# Patient Record
Sex: MALE | Race: WHITE | NOT HISPANIC OR LATINO | Employment: STUDENT | URBAN - METROPOLITAN AREA
[De-identification: names, ages, dates, MRNs, and addresses within clinical notes are randomized per-mention and may not be internally consistent; named-entity substitution may affect disease eponyms.]

---

## 2018-02-06 ENCOUNTER — EH NON-PROVIDER (OUTPATIENT)
Dept: OCCUPATIONAL MEDICINE | Facility: CLINIC | Age: 28
End: 2018-02-06

## 2018-02-06 ENCOUNTER — HOSPITAL ENCOUNTER (OUTPATIENT)
Facility: MEDICAL CENTER | Age: 28
End: 2018-02-06
Attending: PREVENTIVE MEDICINE
Payer: COMMERCIAL

## 2018-02-06 DIAGNOSIS — Z02.89 ENCOUNTER FOR OCCUPATIONAL HEALTH EXAMINATION: ICD-10-CM

## 2018-02-06 PROCEDURE — 86787 VARICELLA-ZOSTER ANTIBODY: CPT | Performed by: PREVENTIVE MEDICINE

## 2018-02-06 PROCEDURE — 86480 TB TEST CELL IMMUN MEASURE: CPT | Performed by: PREVENTIVE MEDICINE

## 2018-02-06 PROCEDURE — 86765 RUBEOLA ANTIBODY: CPT | Performed by: PREVENTIVE MEDICINE

## 2018-02-06 PROCEDURE — 90715 TDAP VACCINE 7 YRS/> IM: CPT | Performed by: PREVENTIVE MEDICINE

## 2018-02-06 PROCEDURE — 86735 MUMPS ANTIBODY: CPT | Performed by: PREVENTIVE MEDICINE

## 2018-02-06 PROCEDURE — 90686 IIV4 VACC NO PRSV 0.5 ML IM: CPT | Performed by: PREVENTIVE MEDICINE

## 2018-02-06 PROCEDURE — 86762 RUBELLA ANTIBODY: CPT | Performed by: PREVENTIVE MEDICINE

## 2018-02-07 LAB
MEV IGG SER IA-ACNC: POSITIVE
MUV IGG SER IA-ACNC: POSITIVE
RUBV AB SER QL: 129.9 IU/ML
VZV IGG SER IA-ACNC: POSITIVE

## 2018-02-09 LAB
M TB TUBERC IFN-G BLD QL: NEGATIVE
M TB TUBERC IFN-G/MITOGEN IGNF BLD: -0.1
M TB TUBERC IGNF/MITOGEN IGNF CONTROL: 40.7 [IU]/ML
MITOGEN IGNF BCKGRD COR BLD-ACNC: 0.21 [IU]/ML

## 2024-02-07 ENCOUNTER — APPOINTMENT (OUTPATIENT)
Dept: RADIOLOGY | Facility: IMAGING CENTER | Age: 34
End: 2024-02-07
Payer: COMMERCIAL

## 2024-02-07 ENCOUNTER — OFFICE VISIT (OUTPATIENT)
Dept: URGENT CARE | Facility: CLINIC | Age: 34
End: 2024-02-07
Payer: COMMERCIAL

## 2024-02-07 VITALS
RESPIRATION RATE: 14 BRPM | BODY MASS INDEX: 19.29 KG/M2 | TEMPERATURE: 98.1 F | HEIGHT: 66 IN | DIASTOLIC BLOOD PRESSURE: 54 MMHG | HEART RATE: 68 BPM | SYSTOLIC BLOOD PRESSURE: 100 MMHG | OXYGEN SATURATION: 99 % | WEIGHT: 120 LBS

## 2024-02-07 DIAGNOSIS — M54.9 SPINE PAIN: ICD-10-CM

## 2024-02-07 DIAGNOSIS — S39.012A BACK STRAIN, INITIAL ENCOUNTER: ICD-10-CM

## 2024-02-07 PROCEDURE — 3074F SYST BP LT 130 MM HG: CPT

## 2024-02-07 PROCEDURE — 72070 X-RAY EXAM THORAC SPINE 2VWS: CPT | Mod: TC | Performed by: RADIOLOGY

## 2024-02-07 PROCEDURE — 3078F DIAST BP <80 MM HG: CPT

## 2024-02-07 PROCEDURE — 72100 X-RAY EXAM L-S SPINE 2/3 VWS: CPT | Mod: TC | Performed by: RADIOLOGY

## 2024-02-07 PROCEDURE — 99203 OFFICE O/P NEW LOW 30 MIN: CPT

## 2024-02-07 ASSESSMENT — ENCOUNTER SYMPTOMS
NAUSEA: 0
DIZZINESS: 0
WEAKNESS: 0
NECK PAIN: 0
HEADACHES: 0
FALLS: 0
TINGLING: 0
SPEECH CHANGE: 0
BACK PAIN: 1
SENSORY CHANGE: 0
VOMITING: 0
SHORTNESS OF BREATH: 0
FEVER: 0
FOCAL WEAKNESS: 0
BLURRED VISION: 0
FLANK PAIN: 0

## 2024-02-07 NOTE — PROGRESS NOTES
"Subjective     Khai Rodgers is a 33 y.o. male who presents with back pain x1 week.     HPI:   Khai is a 34yo male presenting for a back pain x1 week. Reports he squatted a the gym while weight training and felt immediate pain in his spinal column extending from the mid thoracic region to the lumbar region. The pain then improved and is intermittently aggravated by twisting and bending motions. The pain is along the spine and does not radiate. No neck or hip involvement. The pain is relieved with positioning. Denies prior injury to back. No loss of bladder or bowel control. Denies sensation loss or numbness/tinging in lower extremities. No fever. Spine flexion and rotation is limited due to pain.      Review of Systems   Constitutional:  Negative for fever and malaise/fatigue.   Eyes:  Negative for blurred vision.   Respiratory:  Negative for shortness of breath.    Gastrointestinal:  Negative for nausea and vomiting.   Genitourinary:  Negative for dysuria and flank pain.   Musculoskeletal:  Positive for back pain. Negative for falls and neck pain.   Neurological:  Negative for dizziness, tingling, sensory change, speech change, focal weakness, weakness and headaches.     History reviewed. No pertinent past medical history.     History reviewed. No pertinent surgical history.     Allergies: Penicillins     Medications, Allergies, and current problem list reviewed today in Epic.      Objective     /54 (BP Location: Left arm, Patient Position: Sitting, BP Cuff Size: Adult)   Pulse 68   Temp 36.7 °C (98.1 °F) (Temporal)   Resp 14   Ht 1.676 m (5' 6\")   Wt 54.4 kg (120 lb)   SpO2 99%   BMI 19.37 kg/m²      Physical Exam  Vitals reviewed.   Constitutional:       General: He is not in acute distress.  Eyes:      Extraocular Movements: Extraocular movements intact.      Conjunctiva/sclera: Conjunctivae normal.      Pupils: Pupils are equal, round, and reactive to light.   Cardiovascular:      Rate and Rhythm: " Normal rate and regular rhythm.      Pulses: Normal pulses.      Heart sounds: Normal heart sounds.   Pulmonary:      Effort: Pulmonary effort is normal. No tachypnea, accessory muscle usage, prolonged expiration, respiratory distress or retractions.      Breath sounds: Normal breath sounds. No stridor. No wheezing, rhonchi or rales.   Musculoskeletal:      Right shoulder: No swelling, deformity, tenderness, bony tenderness or crepitus. Normal range of motion. Normal strength. Normal pulse.      Left shoulder: No swelling, deformity, tenderness, bony tenderness or crepitus. Normal range of motion. Normal strength. Normal pulse.      Cervical back: Full passive range of motion without pain, normal range of motion and neck supple. No signs of trauma, rigidity, tenderness or crepitus. No pain with movement, spinous process tenderness or muscular tenderness. Normal range of motion.      Thoracic back: No swelling, deformity, spasms, tenderness or bony tenderness. Decreased range of motion.      Lumbar back: No swelling, deformity, spasms, tenderness or bony tenderness. Decreased range of motion.      Right hip: No deformity, tenderness, bony tenderness or crepitus. Normal range of motion. Normal strength.      Left hip: No deformity, tenderness, bony tenderness or crepitus. Normal range of motion. Normal strength.      Comments: No impairment in gait. ROM of hip and bilateral lower extremities intact with no abnormality. Midline tenderness absent. Back flexion reduced to 25 degrees. Back extension and rotation without abnormality. BLE strength 5/5 and bilateral reflexes intact. Distal neurovascular intact     Lymphadenopathy:      Cervical: No cervical adenopathy.   Skin:     General: Skin is warm and dry.   Neurological:      General: No focal deficit present.      Mental Status: He is alert. Mental status is at baseline.      Cranial Nerves: Cranial nerves 2-12 are intact. No cranial nerve deficit.      Sensory:  Sensation is intact.      Motor: Motor function is intact. No weakness or abnormal muscle tone.      Coordination: Coordination is intact.      Gait: Gait is intact.      Deep Tendon Reflexes: Reflexes are normal and symmetric.   Psychiatric:         Mood and Affect: Mood normal.         Behavior: Behavior normal.         Thought Content: Thought content normal.       DX-LUMBAR SPINE-2 OR 3 VIEWS    Result Date: 2/7/2024 2/7/2024 10:05 AM HISTORY/REASON FOR EXAM:  Pain Following Trauma. Back pain after lifting weights. TECHNIQUE/ EXAM DESCRIPTION AND NUMBER OF VIEWS:  3 views of the lumbar spine. COMPARISON: None. FINDINGS: Vertebral alignment is preserved. Minimal levoconvex curvature. Vertebral body heights are preserved. Intervertebral disc spaces preserved. The facet joints show normal alignment. Lumbosacral junction is intact. Pedicles appear intact. Transitional vertebral anatomy with pseudoarthrosis on the RIGHT at the lumbosacral junction.  Probable partial lumbarization of S1.     1.  No lumbar spine fracture or subluxation. 2.  Minimal levoconvex curvature. 3.  Transitional vertebral anatomy.    DX-THORACIC SPINE-2 VIEWS    Result Date: 2/7/2024 2/7/2024 10:05 AM HISTORY/REASON FOR EXAM:  Pain Following Trauma. Back pain after lifting weights. TECHNIQUE/EXAM DESCRIPTION AND NUMBER OF VIEWS:  Thoracic spine, 2 views. COMPARISON:  None. FINDINGS: Vertebral alignment is preserved. Minimal S-shaped curvature. Vertebral body heights are preserved. Intervertebral disc spaces preserved. Pedicles appear intact.     1.  No thoracic spine fracture or subluxation. 2.  No significant degenerative changes. 3.  Minimal S-shaped curvature.       Assessment & Plan     1. Spine pain    - DX-LUMBAR SPINE-2 OR 3 VIEWS; Future  - DX-THORACIC SPINE-2 VIEWS; Future    2. Back strain, initial encounter  - Supportive measures encouraged      Discussed with patient signs and symptoms consistent with a back strain.   Treatment of  as above and initial rest with no heavy lifting, stooping, or strenuous activity. Massage, ice and/or heat which ever feels better, and Tylenol per manufacture's instructions. Encouraged walking, stretching, and range of motion exercises as tolerated. Avoid sitting or laying down for long periods of time except for at night during sleep.    Patient is overall very well-appearing, no acute distress, no neurological deficits, and normal vital signs. Suspicions for acute emergent pathology are low.   Patient lives in [place], referral to Orthopedics unable to be placed due to this. Patient advised to follow up with PCP in [place] for further management     Differential diagnosis includes but is not limited to, acute cauda equina syndrome, acute spinal epidural abscess, acute compression fracture, acute pyelonephritis, AAA, abdominal etiology, acute musculoskeletal strain or sprain. Lower index of suspicion for acute cauda equina as patient has no caudal paresthesia and denies urinary retention or stool incontinence.  Low index of suspicion for acute epidural abscess as patient has no history of IV drug use or fever. Reports no CVA tenderness on exam and denies urinary symptoms so less likely pyelonephritis.  Low index of suspicion for AAA or dissecting aortic aneurysm as patient denies any history of AAA or a ripping or tearing sensation, as well as exam findings are not consistent.  Low suspicion for abdominal etiology as patient has no abdominal tenderness or abdominal complaints.  Also a low index of suspicion for compression fracture.     Illness progression and alarm symptoms discussed with patient, emphasizing low threshold for returning to clinic/emergency department for worsening symptoms. Patient is agreeable to the plan and verbalizes understanding, and will follow up if warranted.       Back pain red flag/alarm features discussed with patient. Patient verbalizes understanding of when to present or  emergency room or call 911.     Differential diagnosis, natural history, supportive care, and indications for immediate follow-up discussed.      Follow-up as needed if symptoms worsen or fail to improve to PCP, Urgent care or Emergency Room.           Electronically signed by SANDOVAL Farrell